# Patient Record
Sex: FEMALE | Race: BLACK OR AFRICAN AMERICAN | NOT HISPANIC OR LATINO | URBAN - METROPOLITAN AREA
[De-identification: names, ages, dates, MRNs, and addresses within clinical notes are randomized per-mention and may not be internally consistent; named-entity substitution may affect disease eponyms.]

---

## 2018-08-16 ENCOUNTER — OFFICE (OUTPATIENT)
Dept: URBAN - METROPOLITAN AREA CLINIC 78 | Facility: CLINIC | Age: 53
End: 2018-08-16

## 2018-08-16 VITALS
HEIGHT: 64 IN | WEIGHT: 206 LBS | HEART RATE: 78 BPM | DIASTOLIC BLOOD PRESSURE: 84 MMHG | SYSTOLIC BLOOD PRESSURE: 122 MMHG | TEMPERATURE: 97.8 F

## 2018-08-16 DIAGNOSIS — K59.09 OTHER CONSTIPATION: ICD-10-CM

## 2018-08-16 PROCEDURE — 99214 OFFICE O/P EST MOD 30 MIN: CPT

## 2018-08-16 RX ORDER — LINACLOTIDE 72 UG/1
CAPSULE, GELATIN COATED ORAL
Qty: 90 | Refills: 3 | Status: COMPLETED
Start: 2018-08-16 | End: 2019-05-15

## 2018-08-16 RX ORDER — PEPPERMINT OIL 90 MG
CAPSULE, DELAYED, AND EXTENDED RELEASE ORAL
Qty: 14 | Refills: 0 | Status: COMPLETED
Start: 2018-08-16 | End: 2019-07-01

## 2018-08-16 RX ORDER — POLYETHYLENE GLYCOL 3350, SODIUM SULFATE ANHYDROUS, SODIUM BICARBONATE, SODIUM CHLORIDE, POTASSIUM CHLORIDE 227.1; 21.5; 6.36; 5.53; .754 G/L; G/L; G/L; G/L; G/L
POWDER, FOR SOLUTION ORAL
Qty: 2 | Refills: 0 | Status: COMPLETED
Start: 2018-08-16 | End: 2019-05-08

## 2018-08-16 NOTE — SERVICEHPINOTES
MONE PERALTA   is a   53  female who complains of chronic constipation, bloating, gas. She has BM 1-2x/day, BSS type 4 while on stool softener Dulcolax, but stopped it due to concern of long term use. She says off stool softeners will have BM every other day, BSS type 1 or 2 with straining and incomplete evacuation. She had recent BM yesterday BSS type 1. She has upper abdominal bloating with flatulence that occurs "immediately" after eating all types of food especially green vegetables. She admits to not eating healthy. She tried Miralax in the past that helped initially but no longer effective. She tried Linzess about two years ago that helped but she stopped due to no longer being able to afford rx. Prior colonoscopy 04/2016 hyperplastic polyp otherwise normal with recall 10 years. Denies n/v, regurgitation, abdominal pain, melena, blood in stool, weigh loss.

## 2019-05-08 ENCOUNTER — OFFICE (OUTPATIENT)
Dept: URBAN - METROPOLITAN AREA CLINIC 33 | Facility: CLINIC | Age: 54
End: 2019-05-08

## 2019-05-08 VITALS
HEART RATE: 68 BPM | TEMPERATURE: 97.9 F | WEIGHT: 204 LBS | SYSTOLIC BLOOD PRESSURE: 127 MMHG | DIASTOLIC BLOOD PRESSURE: 83 MMHG | HEIGHT: 64 IN

## 2019-05-08 DIAGNOSIS — R14.0 ABDOMINAL DISTENSION (GASEOUS): ICD-10-CM

## 2019-05-08 DIAGNOSIS — R14.3 FLATULENCE: ICD-10-CM

## 2019-05-08 DIAGNOSIS — K59.09 OTHER CONSTIPATION: ICD-10-CM

## 2019-05-08 PROCEDURE — 99214 OFFICE O/P EST MOD 30 MIN: CPT

## 2019-05-08 NOTE — SERVICEHPINOTES
Ms. Albarado is here for f/u regarding constipation present since childhood. She has a BM every 10-14 days. 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 She has tried Miralax consistently for days at a time with no resolution of symptoms. She has also tried senna which caused severe pains and prune juice which didn't work. She was given Linzess 72 mcg to try last summer. When she takes this, her constipation and bloating cease. However, the medication is too expensive. When she doesn't take Linzess she has postprandial bloating which she feels more on the left side of her abdomen. She is also perimenopausal and she wonders if this is playing a role in her symptoms. She has no side effects from the Linzess. She denies weight loss, N/V, early satiety, and reflux. No other GI related complaints today.

## 2019-07-01 ENCOUNTER — OFFICE (OUTPATIENT)
Dept: URBAN - METROPOLITAN AREA CLINIC 32 | Facility: CLINIC | Age: 54
End: 2019-07-01

## 2019-07-01 ENCOUNTER — OFFICE (OUTPATIENT)
Dept: URBAN - METROPOLITAN AREA PATHOLOGY 17 | Facility: PATHOLOGY | Age: 54
End: 2019-07-01

## 2019-07-01 VITALS
OXYGEN SATURATION: 100 % | DIASTOLIC BLOOD PRESSURE: 83 MMHG | DIASTOLIC BLOOD PRESSURE: 74 MMHG | SYSTOLIC BLOOD PRESSURE: 145 MMHG | OXYGEN SATURATION: 99 % | RESPIRATION RATE: 16 BRPM | TEMPERATURE: 97.9 F | HEART RATE: 64 BPM | HEART RATE: 82 BPM | SYSTOLIC BLOOD PRESSURE: 138 MMHG | HEART RATE: 58 BPM | OXYGEN SATURATION: 97 % | OXYGEN SATURATION: 98 % | HEIGHT: 64 IN | WEIGHT: 204 LBS | DIASTOLIC BLOOD PRESSURE: 79 MMHG | DIASTOLIC BLOOD PRESSURE: 68 MMHG | HEART RATE: 67 BPM | OXYGEN SATURATION: 90 % | TEMPERATURE: 97.7 F | SYSTOLIC BLOOD PRESSURE: 139 MMHG | HEART RATE: 94 BPM | RESPIRATION RATE: 18 BRPM | SYSTOLIC BLOOD PRESSURE: 143 MMHG | SYSTOLIC BLOOD PRESSURE: 136 MMHG | RESPIRATION RATE: 13 BRPM

## 2019-07-01 DIAGNOSIS — R10.9 UNSPECIFIED ABDOMINAL PAIN: ICD-10-CM

## 2019-07-01 DIAGNOSIS — K29.60 OTHER GASTRITIS WITHOUT BLEEDING: ICD-10-CM

## 2019-07-01 DIAGNOSIS — R14.0 ABDOMINAL DISTENSION (GASEOUS): ICD-10-CM

## 2019-07-01 DIAGNOSIS — K31.89 OTHER DISEASES OF STOMACH AND DUODENUM: ICD-10-CM

## 2019-07-01 PROCEDURE — 43239 EGD BIOPSY SINGLE/MULTIPLE: CPT

## 2019-07-01 PROCEDURE — 88313 SPECIAL STAINS GROUP 2: CPT

## 2019-07-01 PROCEDURE — 88342 IMHCHEM/IMCYTCHM 1ST ANTB: CPT

## 2019-07-01 PROCEDURE — 88305 TISSUE EXAM BY PATHOLOGIST: CPT

## 2019-07-31 ENCOUNTER — OFFICE (OUTPATIENT)
Dept: URBAN - METROPOLITAN AREA CLINIC 33 | Facility: CLINIC | Age: 54
End: 2019-07-31

## 2019-07-31 VITALS
DIASTOLIC BLOOD PRESSURE: 64 MMHG | HEIGHT: 64 IN | SYSTOLIC BLOOD PRESSURE: 104 MMHG | HEART RATE: 83 BPM | TEMPERATURE: 97.2 F | WEIGHT: 216 LBS

## 2019-07-31 DIAGNOSIS — R10.84 GENERALIZED ABDOMINAL PAIN: ICD-10-CM

## 2019-07-31 DIAGNOSIS — R14.0 ABDOMINAL DISTENSION (GASEOUS): ICD-10-CM

## 2019-07-31 DIAGNOSIS — R19.8 OTHER SPECIFIED SYMPTOMS AND SIGNS INVOLVING THE DIGESTIVE S: ICD-10-CM

## 2019-07-31 DIAGNOSIS — R14.3 FLATULENCE: ICD-10-CM

## 2019-07-31 DIAGNOSIS — K59.09 OTHER CONSTIPATION: ICD-10-CM

## 2019-07-31 PROCEDURE — 99214 OFFICE O/P EST MOD 30 MIN: CPT

## 2019-07-31 NOTE — SERVICEHPINOTES
Ms. Albarado is here for follow up. She notes a lifelong hx of bloating and constipation. Her colonoscopy shows a tortuous colon. Linzess works very well for both her bloating and constipation the problem is that it is not covered by her insurance so we give her samples. She underwent an EGD for the bloating which   showed mild gastritis. She has a globus sensation which is alleviated by taking Tums. Currently, she is out of Linzess. Has been using Miralax which produces a BM about every four days. No other GI related complaints today.

## 2020-09-08 ENCOUNTER — OFFICE (OUTPATIENT)
Dept: URBAN - METROPOLITAN AREA TELEHEALTH 7 | Facility: TELEHEALTH | Age: 55
End: 2020-09-08

## 2020-09-08 VITALS — WEIGHT: 216 LBS | HEIGHT: 64 IN

## 2020-09-08 DIAGNOSIS — R10.9 UNSPECIFIED ABDOMINAL PAIN: ICD-10-CM

## 2020-09-08 DIAGNOSIS — R14.0 ABDOMINAL DISTENSION (GASEOUS): ICD-10-CM

## 2020-09-08 DIAGNOSIS — K59.09 OTHER CONSTIPATION: ICD-10-CM

## 2020-09-08 DIAGNOSIS — R19.8 OTHER SPECIFIED SYMPTOMS AND SIGNS INVOLVING THE DIGESTIVE S: ICD-10-CM

## 2020-09-08 PROCEDURE — 99214 OFFICE O/P EST MOD 30 MIN: CPT | Mod: 95 | Performed by: PHYSICIAN ASSISTANT

## 2020-09-08 NOTE — SERVICEHPINOTES
PATIENT VERIFIED BY DATE OF BIRTH AND NAME. Patient has been consented for this telecommunication visit. Reviewed PmHx, Fmhx, SHx. Ms. Albarado is 54 yo female that presents for f/u concerning chronic abdominal pain and constipation. She notes a lifelong hx of abdominal pain, bloating, and constipation. She was last seen in 07/2019 for these same concerns with CT advised to ensure no other etiology: This CT scan was not done (patient informs she was unaware of this plan). She would like to proceed with CT scan due to ongoing symptoms. She also expresses concerns and frustration about insurance company not covering rx Linzess 72 mcg that has been effective for her chronic constipation. She notes constipation manifesting with BMs 1-2 x/week along with "severe bloating." On the rx Linzess 72 mcg qd she recalls being able to achieve a daily BM that was well formed and resolution of her bloating. She can't afford the rx Linzess and insurance will not cover the cost. She has been using OTC laxatives such as Miralax that do not improve her constipation as evident by BMs every 3-4 days and no improvement to her bloating. She also notes return of globus sensation and mild dysphagia felt over throat/high sternal region. She recalls prior use of FDgard along with Tums provided temporary relief initially when used as indicated and no longer effective. Prior EGD in 07/2019 found evidence of mild gastritis (bx benign), normal esophagus, and normal duodenum. No prior barium swallow done to evaluate for her dysphagia-globus sensation. NSAID use: Advil prn for arthritis. Denies n/v, odynophagia, regurgitation, decreased appetite, epigastric pain, weight loss. BR

## 2022-05-13 ENCOUNTER — OFFICE (OUTPATIENT)
Dept: URBAN - METROPOLITAN AREA CLINIC 34 | Facility: CLINIC | Age: 57
End: 2022-05-13

## 2022-05-13 VITALS
DIASTOLIC BLOOD PRESSURE: 79 MMHG | HEIGHT: 64 IN | WEIGHT: 219 LBS | SYSTOLIC BLOOD PRESSURE: 145 MMHG | TEMPERATURE: 97.5 F | HEART RATE: 76 BPM

## 2022-05-13 DIAGNOSIS — R10.9 UNSPECIFIED ABDOMINAL PAIN: ICD-10-CM

## 2022-05-13 DIAGNOSIS — R14.0 ABDOMINAL DISTENSION (GASEOUS): ICD-10-CM

## 2022-05-13 DIAGNOSIS — K59.09 OTHER CONSTIPATION: ICD-10-CM

## 2022-05-13 PROCEDURE — 99214 OFFICE O/P EST MOD 30 MIN: CPT | Performed by: PHYSICIAN ASSISTANT

## 2022-11-29 ENCOUNTER — OFFICE (OUTPATIENT)
Dept: URBAN - METROPOLITAN AREA CLINIC 102 | Facility: CLINIC | Age: 57
End: 2022-11-29

## 2022-11-29 VITALS
WEIGHT: 215 LBS | SYSTOLIC BLOOD PRESSURE: 136 MMHG | DIASTOLIC BLOOD PRESSURE: 77 MMHG | HEIGHT: 64 IN | TEMPERATURE: 98.6 F | HEART RATE: 66 BPM

## 2022-11-29 DIAGNOSIS — R10.9 UNSPECIFIED ABDOMINAL PAIN: ICD-10-CM

## 2022-11-29 DIAGNOSIS — K59.09 OTHER CONSTIPATION: ICD-10-CM

## 2022-11-29 PROCEDURE — 99214 OFFICE O/P EST MOD 30 MIN: CPT | Performed by: PHYSICIAN ASSISTANT

## 2022-11-29 RX ORDER — PRUCALOPRIDE 2 MG/1
TABLET, FILM COATED ORAL
Qty: 30 | Refills: 3 | Status: ACTIVE
Start: 2022-11-29

## 2022-11-29 RX ORDER — PRUCALOPRIDE 1 MG/1
TABLET, FILM COATED ORAL
Qty: 30 | Refills: 3 | Status: ACTIVE
Start: 2022-11-29

## 2022-11-29 NOTE — SERVICEHPINOTES
MONE PERALTA   is a   56 yo  female who complains of constipation, lower abdominal pain. Last seen in 05/2022 for same concern. She informs of chronic constipation going on for many years. She has BMs every 5-7 days with BSS type 3-4 with a lot of gas, flatulence. She mentions prior use of rx Linzess was effective, but no longer used given expensive cost. She has also tried Amitiza then Trulance with no improvement. No trial of Motegrety. She is currently using Miralax 1 cap qd and warm prune juice in the mornings with poor results. She has generalized lower abdominal pain and bloating which goes away when she has a good BM. Last colonoscopy in 06/10/21 with Dr. Collazo which showed normal mucosa to the TI. No hx of abdominal or pelvic surgeries. Denies chest pain, n/v, upper abdominal pain, diarrhea, melena, weight loss. br br
05/2022 CTE no reason for pain or constipation seen. No findings of inflammation in the colon or small bowel. 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 
br
brShe notes ho depression Not taking any medications for this condition. No h/o SI/HI or self harm attempts. br
br  span id="{[id number]}" class="narrative freetextSelected" type="freetext" canedit="true" suppressed="false" nid="[id number]" gid="{[id number]}" bound="false" visited="true"  /spanspan id="{[id number]}" class="narrative placeholderNormal" type="placeholder" canedit="true" suppressed="false" nid="[id number]" gid="{[id number]}" propertyname="rosWording" displayname="ROS Wording" tooltip="" handler="" handlerdata="" datatype="text" mandatory="false" requires="" allowmultipleentries="" describes="" tagname="" prototype="" dontshowempty="false" empty="true" onmouseover="__NarrativeOnMouseOver('{[id number]}')" onmouseout="__NarrativeOnMouseOut('{[id number]}')" onmousedown="__NarrativePlaceholderClicked('{[id number]}')"[ROS Wording]/spanspan id="{[id number]}" class="narrative freetextNormal" type="freetext" canedit="true" suppressed="false" nid="[id number]" gid="{[id number]}" bound="false" /span

## 2025-07-02 ENCOUNTER — OFFICE (OUTPATIENT)
Dept: URBAN - METROPOLITAN AREA CLINIC 34 | Facility: CLINIC | Age: 60
End: 2025-07-02
Payer: OTHER GOVERNMENT

## 2025-07-02 VITALS
SYSTOLIC BLOOD PRESSURE: 140 MMHG | WEIGHT: 215 LBS | HEART RATE: 67 BPM | TEMPERATURE: 98.1 F | DIASTOLIC BLOOD PRESSURE: 89 MMHG | HEIGHT: 64 IN

## 2025-07-02 DIAGNOSIS — R14.0 ABDOMINAL DISTENSION (GASEOUS): ICD-10-CM

## 2025-07-02 DIAGNOSIS — K59.09 OTHER CONSTIPATION: ICD-10-CM

## 2025-07-02 PROCEDURE — 99214 OFFICE O/P EST MOD 30 MIN: CPT | Performed by: HOSPITALIST
